# Patient Record
Sex: MALE | Race: WHITE | NOT HISPANIC OR LATINO | URBAN - METROPOLITAN AREA
[De-identification: names, ages, dates, MRNs, and addresses within clinical notes are randomized per-mention and may not be internally consistent; named-entity substitution may affect disease eponyms.]

---

## 2019-10-14 ENCOUNTER — EMERGENCY (EMERGENCY)
Facility: HOSPITAL | Age: 64
LOS: 1 days | Discharge: HOME | End: 2019-10-14
Attending: EMERGENCY MEDICINE | Admitting: EMERGENCY MEDICINE
Payer: MEDICAID

## 2019-10-14 VITALS
OXYGEN SATURATION: 99 % | DIASTOLIC BLOOD PRESSURE: 74 MMHG | HEART RATE: 63 BPM | TEMPERATURE: 98 F | RESPIRATION RATE: 16 BRPM | SYSTOLIC BLOOD PRESSURE: 140 MMHG

## 2019-10-14 VITALS
HEART RATE: 89 BPM | SYSTOLIC BLOOD PRESSURE: 145 MMHG | WEIGHT: 169.98 LBS | OXYGEN SATURATION: 96 % | TEMPERATURE: 98 F | HEIGHT: 68 IN | RESPIRATION RATE: 16 BRPM | DIASTOLIC BLOOD PRESSURE: 82 MMHG

## 2019-10-14 PROCEDURE — 99284 EMERGENCY DEPT VISIT MOD MDM: CPT

## 2019-10-14 PROCEDURE — 76870 US EXAM SCROTUM: CPT

## 2019-10-14 PROCEDURE — 99284 EMERGENCY DEPT VISIT MOD MDM: CPT | Mod: 25

## 2019-10-14 PROCEDURE — 76870 US EXAM SCROTUM: CPT | Mod: 26

## 2019-10-14 RX ORDER — METFORMIN HYDROCHLORIDE 850 MG/1
1 TABLET ORAL
Qty: 0 | Refills: 0 | DISCHARGE

## 2019-10-14 RX ORDER — CEPHALEXIN 500 MG
1 CAPSULE ORAL
Qty: 14 | Refills: 0
Start: 2019-10-14 | End: 2019-10-20

## 2019-10-14 RX ORDER — ENOXAPARIN SODIUM 100 MG/ML
0 INJECTION SUBCUTANEOUS
Qty: 0 | Refills: 0 | DISCHARGE

## 2019-10-14 RX ORDER — CEPHALEXIN 500 MG
500 CAPSULE ORAL ONCE
Refills: 0 | Status: COMPLETED | OUTPATIENT
Start: 2019-10-14 | End: 2019-10-14

## 2019-10-14 RX ORDER — AZTREONAM 2 G
1 VIAL (EA) INJECTION
Qty: 14 | Refills: 0
Start: 2019-10-14 | End: 2019-10-20

## 2019-10-14 RX ORDER — CLOPIDOGREL BISULFATE 75 MG/1
1 TABLET, FILM COATED ORAL
Qty: 0 | Refills: 0 | DISCHARGE

## 2019-10-14 RX ORDER — METOPROLOL TARTRATE 50 MG
1 TABLET ORAL
Qty: 0 | Refills: 0 | DISCHARGE

## 2019-10-14 RX ORDER — SIMVASTATIN 20 MG/1
1 TABLET, FILM COATED ORAL
Qty: 0 | Refills: 0 | DISCHARGE

## 2019-10-14 RX ADMIN — Medication 1 TABLET(S): at 17:19

## 2019-10-14 RX ADMIN — Medication 500 MILLIGRAM(S): at 17:19

## 2019-10-14 NOTE — ED PROVIDER NOTE - CHPI ED SYMPTOMS NEG
no diarrhea/no blood in stool/no chills/no vomiting/no burning urination/no dysuria/no hematuria/no abdominal distension/no fever/no nausea

## 2019-10-14 NOTE — ED ADULT NURSE NOTE - PMH
DM (diabetes mellitus)    MI (myocardial infarction) DM (diabetes mellitus)    High cholesterol    MI (myocardial infarction)

## 2019-10-14 NOTE — ED PROVIDER NOTE - ATTENDING CONTRIBUTION TO CARE
Pt is a 62 yo male who presents to the ED with a cc of bleeding from scrotal lesion.  PMHx of DM (diabetes mellitus), HLD, MI.  Pt reports that he first noted that he had a scrotal lesion, left sided, approx 4 months ago.  He reports that there was no real pain and the it appeared that the lesion nearly resolved. He did not follow up with anyone concerning this.  Pt reports that approx 3 weeks ago in the same site as prior he noted that the lesion began to grow in size and last week he noted some pain when he would touch the lesion.  He made an appointment to follow up with a urologist on th 22nd but he has not seen him yet.  He became concerned and so he followed up with his PMD on Friday and was prescribed Cipro 500 mg BID.  Pt reports that he has been taking this with improvement in symptoms.  Today when he got into the shower he noted that there was bloody drainage on his pants.  When he looked down he noted that the lesion had mild bloody discharge.  Pt became concerned and came to the ED for further work up.  Denies fever, chills, N/V, CP, SOB, abd pain.  Denies testicular pain.  Denies dysuria, frequency, urgency, discharge from penis.  On exam pt laying in bed NAD, NCAT, PERRL, EOMI, heart RRR, lungs CTA, abd soft NT/ND.  2 cm x 3.5 cm abscess to left upper scrotal region abutting the penis no lesions to penis noted.  No overlying skin changes noted.  No increased warmth, no gas noted in tissue.  Will obtain US and consult urology.

## 2019-10-14 NOTE — ED ADULT NURSE NOTE - OBJECTIVE STATEMENT
received pt in bed #15a Pt A&O states he has a cyst by prostate x 4 months Has appointment w/ urologist on the 22nd. States noticed blood running down leg from cyst this AM. Abscess noted on left groin radiating into scrotum received pt in bed #15a Pt A&O states he has a cyst by prostate x 4 months Has appointment w/ urologist on the 22nd. States noticed blood running down leg from cyst this AM. Cyst noted on left groin radiating into scrotum

## 2019-10-14 NOTE — ED PROVIDER NOTE - CARE PROVIDER_API CALL
[place] Sr)  Urology  [address], [address]  [place] [address]  Phone: [phone]  Fax: [phone]  Follow Up Time:

## 2019-10-14 NOTE — ED PROVIDER NOTE - OBJECTIVE STATEMENT
Pt is a 62 yo male with pmhx of dm htn hld MI cad stent on plavix and asa + smoker here for bleeding left upper scrotal cyst states has had for months and getting bigger + pain and states today while in shower started draining pt has appt with urologist on 10/22 denies any fever chills dysuria abdominal pain.

## 2019-10-14 NOTE — ED ADULT NURSE NOTE - NSIMPLEMENTINTERV_GEN_ALL_ED
Implemented All Universal Safety Interventions:  [place] to call system. Call bell, personal items and telephone within reach. Instruct patient to call for assistance. Room bathroom lighting operational. Non-slip footwear when patient is off stretcher. Physically safe environment: no spills, clutter or unnecessary equipment. Stretcher in lowest position, wheels locked, appropriate side rails in place.

## 2019-10-14 NOTE — ED PROVIDER NOTE - PROGRESS NOTE DETAILS
abscess on us d/w dr. garcia area without cellulitis and + induration advised warm compress change abx to keflex and bactrim with 2 day f/u advised to return for fever redness drainage

## 2019-10-14 NOTE — ED PROVIDER NOTE - PATIENT PORTAL LINK FT
You can access the FollowMyHealth Patient Portal offered by [facility] by registering at the following website: [email or web address]. By joining [facility]’s FollowMyHealth portal, you will also be able to view your health information using other applications (apps) compatible with our system.

## 2019-10-14 NOTE — ED PROVIDER NOTE - NSFOLLOWUPINSTRUCTIONS_ED_ALL_ED_FT
Follow up with urologist advised warm compresses to area take new antibiotics return for fever redness or worsening symptoms

## 2019-10-14 NOTE — ED ADULT TRIAGE NOTE - CHIEF COMPLAINT QUOTE
"I have a cyst on the outside of my prostate. I took a shower today and I noticed blood going down my leg. I have an appt with a urologist on the 22nd"

## 2019-12-06 PROBLEM — E11.9 TYPE 2 DIABETES MELLITUS WITHOUT COMPLICATIONS: Chronic | Status: ACTIVE | Noted: 2019-10-14

## 2019-12-06 PROBLEM — E78.00 PURE HYPERCHOLESTEROLEMIA, UNSPECIFIED: Chronic | Status: ACTIVE | Noted: 2019-10-14

## 2019-12-06 PROBLEM — I21.9 ACUTE MYOCARDIAL INFARCTION, UNSPECIFIED: Chronic | Status: ACTIVE | Noted: 2019-10-14

## 2020-01-14 ENCOUNTER — APPOINTMENT (OUTPATIENT)
Dept: UROLOGY | Facility: CLINIC | Age: 65
End: 2020-01-14